# Patient Record
Sex: MALE | Race: WHITE | NOT HISPANIC OR LATINO | Employment: UNEMPLOYED | URBAN - METROPOLITAN AREA
[De-identification: names, ages, dates, MRNs, and addresses within clinical notes are randomized per-mention and may not be internally consistent; named-entity substitution may affect disease eponyms.]

---

## 2021-05-27 ENCOUNTER — RECORDS - HEALTHEAST (OUTPATIENT)
Dept: ADMINISTRATIVE | Facility: CLINIC | Age: 26
End: 2021-05-27

## 2021-05-31 ENCOUNTER — RECORDS - HEALTHEAST (OUTPATIENT)
Dept: ADMINISTRATIVE | Facility: CLINIC | Age: 26
End: 2021-05-31

## 2021-11-23 ENCOUNTER — HOSPITAL ENCOUNTER (EMERGENCY)
Facility: CLINIC | Age: 26
Discharge: HOME | End: 2021-11-23
Attending: EMERGENCY MEDICINE | Admitting: EMERGENCY MEDICINE
Payer: COMMERCIAL

## 2021-11-23 ENCOUNTER — APPOINTMENT (OUTPATIENT)
Dept: CT IMAGING | Facility: CLINIC | Age: 26
End: 2021-11-23
Attending: EMERGENCY MEDICINE
Payer: COMMERCIAL

## 2021-11-23 VITALS
TEMPERATURE: 97.8 F | OXYGEN SATURATION: 97 % | SYSTOLIC BLOOD PRESSURE: 142 MMHG | HEART RATE: 89 BPM | DIASTOLIC BLOOD PRESSURE: 100 MMHG | WEIGHT: 210 LBS | RESPIRATION RATE: 16 BRPM

## 2021-11-23 DIAGNOSIS — R10.9 LEFT FLANK PAIN: ICD-10-CM

## 2021-11-23 DIAGNOSIS — N20.1 URETEROLITHIASIS: ICD-10-CM

## 2021-11-23 LAB
ALBUMIN SERPL-MCNC: 3.5 G/DL (ref 3.4–5)
ALBUMIN UR-MCNC: NEGATIVE MG/DL
ALP SERPL-CCNC: 76 U/L (ref 40–150)
ALT SERPL W P-5'-P-CCNC: 67 U/L (ref 0–70)
ANION GAP SERPL CALCULATED.3IONS-SCNC: 3 MMOL/L (ref 3–14)
APPEARANCE UR: CLEAR
AST SERPL W P-5'-P-CCNC: 26 U/L (ref 0–45)
BASOPHILS # BLD AUTO: 0.1 10E3/UL (ref 0–0.2)
BASOPHILS NFR BLD AUTO: 1 %
BILIRUB SERPL-MCNC: 0.6 MG/DL (ref 0.2–1.3)
BILIRUB UR QL STRIP: NEGATIVE
BUN SERPL-MCNC: 14 MG/DL (ref 7–30)
CALCIUM SERPL-MCNC: 8.6 MG/DL (ref 8.5–10.1)
CHLORIDE BLD-SCNC: 108 MMOL/L (ref 94–109)
CO2 SERPL-SCNC: 27 MMOL/L (ref 20–32)
COLOR UR AUTO: YELLOW
CREAT SERPL-MCNC: 1.22 MG/DL (ref 0.66–1.25)
EOSINOPHIL # BLD AUTO: 0.1 10E3/UL (ref 0–0.7)
EOSINOPHIL NFR BLD AUTO: 1 %
ERYTHROCYTE [DISTWIDTH] IN BLOOD BY AUTOMATED COUNT: 13.1 % (ref 10–15)
GFR SERPL CREATININE-BSD FRML MDRD: 81 ML/MIN/1.73M2
GLUCOSE BLD-MCNC: 86 MG/DL (ref 70–99)
GLUCOSE UR STRIP-MCNC: NEGATIVE MG/DL
HCT VFR BLD AUTO: 41.4 % (ref 40–53)
HGB BLD-MCNC: 13.3 G/DL (ref 13.3–17.7)
HGB UR QL STRIP: NEGATIVE
IMM GRANULOCYTES # BLD: 0 10E3/UL
IMM GRANULOCYTES NFR BLD: 0 %
KETONES UR STRIP-MCNC: NEGATIVE MG/DL
LEUKOCYTE ESTERASE UR QL STRIP: NEGATIVE
LYMPHOCYTES # BLD AUTO: 1.4 10E3/UL (ref 0.8–5.3)
LYMPHOCYTES NFR BLD AUTO: 20 %
MCH RBC QN AUTO: 27.8 PG (ref 26.5–33)
MCHC RBC AUTO-ENTMCNC: 32.1 G/DL (ref 31.5–36.5)
MCV RBC AUTO: 86 FL (ref 78–100)
MONOCYTES # BLD AUTO: 0.8 10E3/UL (ref 0–1.3)
MONOCYTES NFR BLD AUTO: 12 %
MUCOUS THREADS #/AREA URNS LPF: PRESENT /LPF
NEUTROPHILS # BLD AUTO: 4.8 10E3/UL (ref 1.6–8.3)
NEUTROPHILS NFR BLD AUTO: 66 %
NITRATE UR QL: NEGATIVE
NRBC # BLD AUTO: 0 10E3/UL
NRBC BLD AUTO-RTO: 0 /100
PH UR STRIP: 5 [PH] (ref 5–7)
PLATELET # BLD AUTO: 253 10E3/UL (ref 150–450)
POTASSIUM BLD-SCNC: 4.1 MMOL/L (ref 3.4–5.3)
PROT SERPL-MCNC: 7 G/DL (ref 6.8–8.8)
RBC # BLD AUTO: 4.79 10E6/UL (ref 4.4–5.9)
RBC URINE: 2 /HPF
SODIUM SERPL-SCNC: 138 MMOL/L (ref 133–144)
SP GR UR STRIP: 1.03 (ref 1–1.03)
UROBILINOGEN UR STRIP-MCNC: NORMAL MG/DL
WBC # BLD AUTO: 7.3 10E3/UL (ref 4–11)
WBC URINE: 1 /HPF

## 2021-11-23 PROCEDURE — 80053 COMPREHEN METABOLIC PANEL: CPT | Performed by: EMERGENCY MEDICINE

## 2021-11-23 PROCEDURE — 96375 TX/PRO/DX INJ NEW DRUG ADDON: CPT | Performed by: EMERGENCY MEDICINE

## 2021-11-23 PROCEDURE — 74176 CT ABD & PELVIS W/O CONTRAST: CPT

## 2021-11-23 PROCEDURE — 250N000011 HC RX IP 250 OP 636: Performed by: EMERGENCY MEDICINE

## 2021-11-23 PROCEDURE — 99284 EMERGENCY DEPT VISIT MOD MDM: CPT | Performed by: EMERGENCY MEDICINE

## 2021-11-23 PROCEDURE — 96374 THER/PROPH/DIAG INJ IV PUSH: CPT | Performed by: EMERGENCY MEDICINE

## 2021-11-23 PROCEDURE — 85025 COMPLETE CBC W/AUTO DIFF WBC: CPT | Performed by: EMERGENCY MEDICINE

## 2021-11-23 PROCEDURE — 99285 EMERGENCY DEPT VISIT HI MDM: CPT | Mod: 25 | Performed by: EMERGENCY MEDICINE

## 2021-11-23 PROCEDURE — 81001 URINALYSIS AUTO W/SCOPE: CPT | Performed by: EMERGENCY MEDICINE

## 2021-11-23 PROCEDURE — 36415 COLL VENOUS BLD VENIPUNCTURE: CPT | Performed by: EMERGENCY MEDICINE

## 2021-11-23 RX ORDER — ACETAMINOPHEN 500 MG
1000 TABLET ORAL ONCE
Status: DISCONTINUED | OUTPATIENT
Start: 2021-11-23 | End: 2021-11-23 | Stop reason: HOSPADM

## 2021-11-23 RX ORDER — ONDANSETRON 2 MG/ML
4 INJECTION INTRAMUSCULAR; INTRAVENOUS ONCE
Status: COMPLETED | OUTPATIENT
Start: 2021-11-23 | End: 2021-11-23

## 2021-11-23 RX ORDER — TAMSULOSIN HYDROCHLORIDE 0.4 MG/1
0.4 CAPSULE ORAL DAILY
Qty: 14 CAPSULE | Refills: 0 | Status: SHIPPED | OUTPATIENT
Start: 2021-11-23 | End: 2021-12-07

## 2021-11-23 RX ORDER — OXYCODONE HYDROCHLORIDE 5 MG/1
5 TABLET ORAL EVERY 6 HOURS PRN
Qty: 6 TABLET | Refills: 0 | Status: SHIPPED | OUTPATIENT
Start: 2021-11-23

## 2021-11-23 RX ORDER — ONDANSETRON 4 MG/1
4 TABLET, ORALLY DISINTEGRATING ORAL EVERY 8 HOURS PRN
Qty: 12 TABLET | Refills: 0 | Status: SHIPPED | OUTPATIENT
Start: 2021-11-23

## 2021-11-23 RX ORDER — KETOROLAC TROMETHAMINE 15 MG/ML
15 INJECTION, SOLUTION INTRAMUSCULAR; INTRAVENOUS ONCE
Status: COMPLETED | OUTPATIENT
Start: 2021-11-23 | End: 2021-11-23

## 2021-11-23 RX ADMIN — ONDANSETRON 4 MG: 2 INJECTION INTRAMUSCULAR; INTRAVENOUS at 06:02

## 2021-11-23 RX ADMIN — KETOROLAC TROMETHAMINE 15 MG: 15 INJECTION, SOLUTION INTRAMUSCULAR; INTRAVENOUS at 06:00

## 2021-11-23 ASSESSMENT — ENCOUNTER SYMPTOMS
FLANK PAIN: 1
NECK STIFFNESS: 0
FEVER: 0
NUMBNESS: 0
ABDOMINAL PAIN: 0
DYSURIA: 1
NAUSEA: 1
LIGHT-HEADEDNESS: 0
CHEST TIGHTNESS: 0
DYSPHORIC MOOD: 0
DIARRHEA: 0
FATIGUE: 0
VOMITING: 0
COUGH: 0
HEMATURIA: 0
WOUND: 0
SHORTNESS OF BREATH: 0
NECK PAIN: 0
CHILLS: 0
BACK PAIN: 1
APPETITE CHANGE: 0

## 2021-11-23 NOTE — DISCHARGE INSTRUCTIONS
600 mg ibuprofen 3 times a day for pain control.  May take Roxicodone as prescribed for breakthrough pain.  Take tamsulosin to help facilitate passage of stone.  Ondansetron as needed for nausea and vomiting.  Should follow-up with urology, outpatient referral placed.    Should return to emergency department if develops fever greater than 100.4, pain with urination, or other signs of systemic infection.

## 2021-11-23 NOTE — ED PROVIDER NOTES
"  History     Chief Complaint   Patient presents with     Flank Pain     Left sided     HPI  Braden Ballesteros is a 25 year old male with reported history of a previous kidney stone a few weeks back presenting for evaluation of recurrent left flank pain.  Reports abrupt onset of left posterior flank area pain 2 days ago.  Pain reportedly sharp and localized around his left kidney area.  Pain episodes are associated with nausea but no vomiting.  Pain is sharp with some waxing and waning intensity.  Denies fevers or chills.  Reports mild dysuria but no urgency or frequency.  Denies hematuria.  Patient reports he is concerned his kidneys \"are failing\".  Denies history of kidney problems in the past.  Has been eating and drinking normally.  No diarrhea or constipation.  Denies any injury.  Reports he took a dose of Tylenol ibuprofen early yesterday evening but no medication since then.  Not on any other medications.  No other chronic medical conditions.  Patient is currently an inmate at [facility]    Allergies:  Allergies   Allergen Reactions     Amoxicillin Unknown       Problem List:    There are no problems to display for this patient.       Past Medical History:    No past medical history on file.    Past Surgical History:    No past surgical history on file.    Family History:    No family history on file.    Social History:  Marital Status:  Single [1]  Social History     Tobacco Use     Smoking status: Never Smoker     Smokeless tobacco: Not on file   Substance Use Topics     Alcohol use: Not on file     Drug use: Not on file        Medications:    No current outpatient medications on file.        Review of Systems   Constitutional: Negative for appetite change, chills, fatigue and fever.   HENT: Negative for congestion.    Respiratory: Negative for cough, chest tightness and shortness of breath.    Cardiovascular: Negative for chest pain.   Gastrointestinal: Positive for nausea. Negative for abdominal pain, " diarrhea and vomiting.   Genitourinary: Positive for dysuria (Mild) and flank pain (Left-sided). Negative for decreased urine volume, hematuria and testicular pain.   Musculoskeletal: Positive for back pain (Left mid back around the kidney). Negative for neck pain and neck stiffness.   Skin: Negative for rash and wound.   Neurological: Negative for light-headedness and numbness.   Psychiatric/Behavioral: Negative for dysphoric mood.   All other systems reviewed and are negative.      Physical Exam   BP: (!) 142/100  Pulse: 93  Temp: 97.8  F (36.6  C)  Resp: 16  Weight: 95.3 kg (210 lb)  SpO2: 97 %      Physical Exam  Vitals and nursing note reviewed.   Constitutional:       Appearance: Normal appearance. He is not ill-appearing or diaphoretic.      Comments: Sitting semireclined in no distress.  Able to move relatively easily without apparent pain.   HENT:      Head: Atraumatic.      Nose: Nose normal.      Mouth/Throat:      Mouth: Mucous membranes are moist.   Eyes:      Conjunctiva/sclera: Conjunctivae normal.   Cardiovascular:      Rate and Rhythm: Normal rate.      Pulses: Normal pulses.   Pulmonary:      Effort: Pulmonary effort is normal.      Breath sounds: Normal breath sounds.   Abdominal:      Palpations: Abdomen is soft.      Tenderness: There is no abdominal tenderness. There is left CVA tenderness. There is no right CVA tenderness or guarding.   Musculoskeletal:         General: Normal range of motion.      Cervical back: Normal range of motion.        Back:    Skin:     General: Skin is warm and dry.      Capillary Refill: Capillary refill takes less than 2 seconds.   Neurological:      Mental Status: He is alert and oriented to person, place, and time.   Psychiatric:         Mood and Affect: Mood normal.         ED Course        Procedures             Results for orders placed or performed during the hospital encounter of 11/23/21 (from the past 24 hour(s))   UA with Microscopic reflex to Culture     Specimen: Urine, NOS   Result Value Ref Range    Color Urine Yellow Colorless, Straw, Light Yellow, Yellow    Appearance Urine Clear Clear    Glucose Urine Negative Negative mg/dL    Bilirubin Urine Negative Negative    Ketones Urine Negative Negative mg/dL    Specific Gravity Urine 1.026 1.003 - 1.035    Blood Urine Negative Negative    pH Urine 5.0 5.0 - 7.0    Protein Albumin Urine Negative Negative mg/dL    Urobilinogen Urine Normal Normal, 2.0 mg/dL    Nitrite Urine Negative Negative    Leukocyte Esterase Urine Negative Negative    Mucus Urine Present (A) None Seen /LPF    RBC Urine 2 <=2 /HPF    WBC Urine 1 <=5 /HPF    Narrative    Urine Culture not indicated       Medications   ketorolac (TORADOL) injection 15 mg (has no administration in time range)   ondansetron (ZOFRAN) injection 4 mg (has no administration in time range)     6:27 AM: Patient was signed out at shift change to Dr Ollie Simpson & Plan (with Medical Decision Making)  25 old male with reported history of kidney stones in the past presenting for evaluation of left flank pain/left posterior mid back pain.  Patient reports pain is similar to a kidney stone he had a few weeks ago which was diagnosed at an outside facility.  Patient reports an ultrasound at that time and symptoms resolved.  Has not had recurrent symptoms in the last 2 days.  Reports mild urinary discomfort but main pain is in the left flank and left posterior back area.  Has some localized tenderness in his mid back but no abdominal tenderness.  Appears comfortable in the ED.  Screening urine, labs, and CT obtained to evaluate for possible kidney stone or other pathology to cause his right flank pain.  Provided ketorolac and ondansetron for symptom relief.  Signed out at shift change pending completion of work-up.     I have reviewed the nursing notes.    I have reviewed the findings, diagnosis, plan and need for follow up with the patient.       New Prescriptions    No  medications on file       Final diagnoses:   Left flank pain       11/23/2021   [facility] EMERGENCY DEPT     Oliver, Francisco Javier Irby MD  11/23/21 5029

## 2021-11-23 NOTE — ED PROVIDER NOTES
Emergency Department Patient Sign-out       Brief HPI:  This is a 25 year old male signed out to me by Dr. Oliver .  See initial ED Provider note for details of the presentation.       Significant Events prior to my assuming care: 5-year-old male with history of previous kidney stone who presents with recurrent left flank pain.  Pain started abruptly 2 days ago.  Associated nausea but no vomiting.  Waxing waning intensity.  No fevers, chills, dysuria frequency urgency.  No reported hematuria.      Exam:   Patient Vitals for the past 24 hrs:   BP Temp Temp src Pulse Resp SpO2 Weight   11/23/21 0530 (!) 142/100 -- -- 89 -- -- --   11/23/21 0517 -- 97.8  F (36.6  C) Oral 93 16 97 % 95.3 kg (210 lb)           ED RESULTS:   Results for orders placed or performed during the hospital encounter of 11/23/21 (from the past 24 hour(s))   UA with Microscopic reflex to Culture     Status: Abnormal    Collection Time: 11/23/21  5:35 AM    Specimen: Urine, NOS   Result Value Ref Range    Color Urine Yellow Colorless, Straw, Light Yellow, Yellow    Appearance Urine Clear Clear    Glucose Urine Negative Negative mg/dL    Bilirubin Urine Negative Negative    Ketones Urine Negative Negative mg/dL    Specific Gravity Urine 1.026 1.003 - 1.035    Blood Urine Negative Negative    pH Urine 5.0 5.0 - 7.0    Protein Albumin Urine Negative Negative mg/dL    Urobilinogen Urine Normal Normal, 2.0 mg/dL    Nitrite Urine Negative Negative    Leukocyte Esterase Urine Negative Negative    Mucus Urine Present (A) None Seen /LPF    RBC Urine 2 <=2 /HPF    WBC Urine 1 <=5 /HPF    Narrative    Urine Culture not indicated   CBC with platelets differential     Status: None    Collection Time: 11/23/21  5:59 AM    Narrative    The following orders were created for panel order CBC with platelets differential.  Procedure                               Abnormality         Status                     ---------                               -----------          ------                     CBC with platelets and d...[[id number]]                      Final result                 Please view results for these tests on the individual orders.   Comprehensive metabolic panel     Status: Normal    Collection Time: 11/23/21  5:59 AM   Result Value Ref Range    Sodium 138 133 - 144 mmol/L    Potassium 4.1 3.4 - 5.3 mmol/L    Chloride 108 94 - 109 mmol/L    Carbon Dioxide (CO2) 27 20 - 32 mmol/L    Anion Gap 3 3 - 14 mmol/L    Urea Nitrogen 14 7 - 30 mg/dL    Creatinine 1.22 0.66 - 1.25 mg/dL    Calcium 8.6 8.5 - 10.1 mg/dL    Glucose 86 70 - 99 mg/dL    Alkaline Phosphatase 76 40 - 150 U/L    AST 26 0 - 45 U/L    ALT 67 0 - 70 U/L    Protein Total 7.0 6.8 - 8.8 g/dL    Albumin 3.5 3.4 - 5.0 g/dL    Bilirubin Total 0.6 0.2 - 1.3 mg/dL    GFR Estimate 81 >60 mL/min/1.73m2   CBC with platelets and differential     Status: None    Collection Time: 11/23/21  5:59 AM   Result Value Ref Range    WBC Count 7.3 4.0 - 11.0 10e3/uL    RBC Count 4.79 4.40 - 5.90 10e6/uL    Hemoglobin 13.3 13.3 - 17.7 g/dL    Hematocrit 41.4 40.0 - 53.0 %    MCV 86 78 - 100 fL    MCH 27.8 26.5 - 33.0 pg    MCHC 32.1 31.5 - 36.5 g/dL    RDW 13.1 10.0 - 15.0 %    Platelet Count 253 150 - 450 10e3/uL    % Neutrophils 66 %    % Lymphocytes 20 %    % Monocytes 12 %    % Eosinophils 1 %    % Basophils 1 %    % Immature Granulocytes 0 %    NRBCs per 100 WBC 0 <1 /100    Absolute Neutrophils 4.8 1.6 - 8.3 10e3/uL    Absolute Lymphocytes 1.4 0.8 - 5.3 10e3/uL    Absolute Monocytes 0.8 0.0 - 1.3 10e3/uL    Absolute Eosinophils 0.1 0.0 - 0.7 10e3/uL    Absolute Basophils 0.1 0.0 - 0.2 10e3/uL    Absolute Immature Granulocytes 0.0 <=0.0 10e3/uL    Absolute NRBCs 0.0 10e3/uL   Abd/pelvis CT - no contrast - Stone Protocol     Status: None    Collection Time: 11/23/21  6:44 AM    Narrative    EXAM: CT ABDOMEN PELVIS W/O CONTRAST  LOCATION: [facility]  DATE/TIME: 11/23/2021 6:31 AM    INDICATION:  Flank pain, kidney stone suspected  COMPARISON: None.  TECHNIQUE: CT scan of the abdomen and pelvis was performed without IV contrast. Multiplanar reformats were obtained. Dose reduction techniques were used.  CONTRAST: None.    FINDINGS:   LOWER CHEST: Normal.    HEPATOBILIARY: Normal.    PANCREAS: Normal.    SPLEEN: Normal.    ADRENAL GLANDS: Normal.    KIDNEYS/BLADDER: Normal right kidney. Mild left hydronephrosis secondary to a 3 x 3 x 3 mm stone in the left mid ureter just above the pelvic brim. No distal ureteral stone fragment. No bladder stone.    BOWEL: Normal caliber large and small bowel. Normal appendix.    LYMPH NODES: Normal.    VASCULATURE: Unremarkable.    PELVIC ORGANS: Normal.    MUSCULOSKELETAL: Normal.      Impression    IMPRESSION:   1.  3 mm left mid ureteral stone causing mild left hydronephrosis.         ED MEDICATIONS:   Medications   ketorolac (TORADOL) injection 15 mg (15 mg Intravenous Given 11/23/21 0600)   ondansetron (ZOFRAN) injection 4 mg (4 mg Intravenous Given 11/23/21 0602)         Impression:    ICD-10-CM    1. Left flank pain  R10.9        Plan:    Pending studies include: Follow-up on CT scan results.    CT scan with 3 mm mid ureteral stone on the left with mild hydronephrosis.  Recommend tamsulosin, ibuprofen, and oxycodone for breakthrough pain.  Ondansetron for nausea vomiting.  Should follow-up with urology, urology referral placed, this can be arranged by medical staff at [facility] present.        MD Azra Krueger Richard J, MD  11/23/21 9090

## 2021-11-24 ENCOUNTER — TELEPHONE (OUTPATIENT)
Dept: UROLOGY | Facility: CLINIC | Age: 26
End: 2021-11-24

## 2021-11-24 NOTE — TELEPHONE ENCOUNTER
Message left for patient to call clinic to set up an appointment for kidney stone follow-up.  Micki Sandoval RN

## 2021-11-29 ENCOUNTER — TELEPHONE (OUTPATIENT)
Dept: UROLOGY | Facility: CLINIC | Age: 26
End: 2021-11-29

## 2021-12-01 ENCOUNTER — TELEPHONE (OUTPATIENT)
Dept: UROLOGY | Facility: CLINIC | Age: 26
End: 2021-12-01

## 2021-12-01 NOTE — TELEPHONE ENCOUNTER
Attempted to call patient for emergency room follow-up.  Phone was answered and then disconnected.  Micki Sandoval RN